# Patient Record
Sex: FEMALE | Race: WHITE | Employment: OTHER | URBAN - NONMETROPOLITAN AREA
[De-identification: names, ages, dates, MRNs, and addresses within clinical notes are randomized per-mention and may not be internally consistent; named-entity substitution may affect disease eponyms.]

---

## 2017-01-17 DIAGNOSIS — R63.5 WEIGHT GAIN: ICD-10-CM

## 2017-01-17 PROBLEM — R10.9 ABDOMINAL SPASMS: Status: ACTIVE | Noted: 2017-01-17

## 2017-01-17 LAB
T3 FREE: 3.7 PG/ML (ref 2–4.4)
T4 FREE: 1.1 NG/ML (ref 0.9–1.7)
TSH SERPL DL<=0.05 MIU/L-ACNC: 2.41 UIU/ML (ref 0.27–4.2)

## 2017-02-17 PROBLEM — R10.13 EPIGASTRIC BURNING SENSATION: Status: ACTIVE | Noted: 2017-02-17

## 2017-04-13 PROBLEM — B37.31 VAGINAL YEAST INFECTION: Status: ACTIVE | Noted: 2017-04-13

## 2017-07-11 DIAGNOSIS — K52.9 POSTPRANDIAL DIARRHEA: ICD-10-CM

## 2017-07-11 DIAGNOSIS — R19.5 ABNORMAL FINDINGS IN STOOL: ICD-10-CM

## 2017-07-11 LAB
C DIFFICILE TOXIN, EIA: NORMAL
CRYPTOSPORIDIUM ANTIGEN STOOL: NORMAL
GIARDIA ANTIGEN STOOL: NORMAL

## 2017-07-14 LAB
CULTURE, STOOL: NORMAL
E COLI SHIGA TOXIN ASSAY: NORMAL

## 2020-04-27 PROBLEM — E04.1 LEFT THYROID NODULE: Status: ACTIVE | Noted: 2020-04-27

## 2020-04-27 PROBLEM — J35.8 MUCOUS CYST OF TONSIL: Status: ACTIVE | Noted: 2020-04-27

## 2020-06-09 PROBLEM — Z98.890 S/P FINE NEEDLE ASPIRATION: Status: ACTIVE | Noted: 2020-06-09

## 2021-01-29 PROBLEM — R19.8 ALTERNATING CONSTIPATION AND DIARRHEA: Status: ACTIVE | Noted: 2021-01-29

## 2021-01-29 PROBLEM — Z86.010 HISTORY OF COLON POLYPS: Status: ACTIVE | Noted: 2021-01-29

## 2021-01-29 PROBLEM — R10.13 DYSPEPSIA: Status: ACTIVE | Noted: 2021-01-29

## 2021-01-29 PROBLEM — Z86.0100 HISTORY OF COLON POLYPS: Status: ACTIVE | Noted: 2021-01-29

## 2021-01-29 PROBLEM — R19.4 CHANGE IN BOWEL HABITS: Status: ACTIVE | Noted: 2021-01-29

## 2021-04-28 PROBLEM — K80.50 BILIARY COLIC: Status: ACTIVE | Noted: 2021-04-28

## 2021-10-12 PROBLEM — L98.9 SKIN LESION OF FACE: Chronic | Status: ACTIVE | Noted: 2021-09-29

## 2021-11-17 PROBLEM — L98.492: Status: ACTIVE | Noted: 2021-11-17

## 2021-11-17 PROBLEM — T81.30XA WOUND DEHISCENCE: Status: ACTIVE | Noted: 2021-11-17

## 2023-04-17 ENCOUNTER — OFFICE VISIT (OUTPATIENT)
Dept: NEUROLOGY | Age: 53
End: 2023-04-17

## 2023-04-17 VITALS
HEART RATE: 76 BPM | WEIGHT: 117 LBS | BODY MASS INDEX: 20.73 KG/M2 | SYSTOLIC BLOOD PRESSURE: 124 MMHG | OXYGEN SATURATION: 98 % | DIASTOLIC BLOOD PRESSURE: 76 MMHG | HEIGHT: 63 IN

## 2023-04-17 DIAGNOSIS — M54.6 CHRONIC BILATERAL THORACIC BACK PAIN: ICD-10-CM

## 2023-04-17 DIAGNOSIS — M54.2 NECK PAIN: ICD-10-CM

## 2023-04-17 DIAGNOSIS — G89.29 CHRONIC BILATERAL THORACIC BACK PAIN: ICD-10-CM

## 2023-04-17 DIAGNOSIS — R42 DIZZINESS: ICD-10-CM

## 2023-04-17 DIAGNOSIS — R25.1 TREMOR: Primary | ICD-10-CM

## 2023-04-17 PROCEDURE — 99214 OFFICE O/P EST MOD 30 MIN: CPT | Performed by: PSYCHIATRY & NEUROLOGY

## 2023-04-17 RX ORDER — PROPRANOLOL HYDROCHLORIDE 60 MG/1
60 TABLET ORAL 2 TIMES DAILY
Qty: 60 TABLET | Refills: 11 | Status: SHIPPED | OUTPATIENT
Start: 2023-04-17

## 2023-04-17 NOTE — PROGRESS NOTES
[facility] Neurology Office Visit    Patient:   Ava Schwartz  MR#:    [id number]  Account Number:                         YOB: 1970  Date of Evaluation:  4/17/2023  Time of Note:                          10:45 AM  Primary/Referring Physician:  Carley Bishop MD   Consulting Physician:  Otis Carrel, DO      Chief Complaint   Patient presents with    6 Month Follow-Up    Tremors       HISTORY OF PRESENT ILLNESS    Ava Schwartz is a 50y.o. year old female here with tremor, back pain, rib pain, here for follow up. Doing about the same since last seen overall, no overt worsening since last seen. Tremor is about the same since last seen, still waxing and waning. Back pain is about the same, no overt  worsening, waxing and waning. Started on beta-blocker by primary for hypertension. CT chest negative outside of a benign appearing thyroid nodule. Seen by ENT, aspiration performed and negative per patient. Labs negative outside of borderline h/h and low folate. CXR negative. CT -T-spine negative. Prior repeat CBC was ok. No new complaints today. PT helped with back pain previously. Noting neck pain and unchanged since last seen, also waxing and waning. Severity, quality, duration, is about the same since last seen. Noting more dizziness at times, improved overall since last seen. Not overtly vertigo. No other complaints today. Prior NCS/EMG BUE was normal. Some possible distal right lower extremity weakness noted intermittently as well, remains unchanged.       Past Medical History:   Diagnosis Date    Arthritis     Asthmatic bronchitis     Essential tremor     Fibrocystic breast disease 11/6/2012    Gastritis     GERD (gastroesophageal reflux disease)     Headache(784.0)     Kidney stones     Osteopenia     PONV (postoperative nausea and vomiting)     Tremor        Past Surgical History:   Procedure Laterality Date    BREAST LUMPECTOMY      BREAST SURGERY Bilateral 2000    saline breast implants

## 2023-08-28 ENCOUNTER — OFFICE VISIT (OUTPATIENT)
Dept: GASTROENTEROLOGY | Age: 53
End: 2023-08-28
Payer: COMMERCIAL

## 2023-08-28 VITALS
HEART RATE: 78 BPM | HEIGHT: 63 IN | BODY MASS INDEX: 18.78 KG/M2 | SYSTOLIC BLOOD PRESSURE: 138 MMHG | WEIGHT: 106 LBS | DIASTOLIC BLOOD PRESSURE: 80 MMHG | OXYGEN SATURATION: 98 %

## 2023-08-28 DIAGNOSIS — R10.13 EPIGASTRIC PAIN: Primary | ICD-10-CM

## 2023-08-28 DIAGNOSIS — R19.5 LOOSE STOOLS: ICD-10-CM

## 2023-08-28 DIAGNOSIS — R10.9 ABDOMINAL CRAMPING: ICD-10-CM

## 2023-08-28 DIAGNOSIS — R10.13 DYSPEPSIA: ICD-10-CM

## 2023-08-28 PROCEDURE — 3017F COLORECTAL CA SCREEN DOC REV: CPT | Performed by: NURSE PRACTITIONER

## 2023-08-28 PROCEDURE — 4004F PT TOBACCO SCREEN RCVD TLK: CPT | Performed by: NURSE PRACTITIONER

## 2023-08-28 PROCEDURE — G8427 DOCREV CUR MEDS BY ELIG CLIN: HCPCS | Performed by: NURSE PRACTITIONER

## 2023-08-28 PROCEDURE — 99214 OFFICE O/P EST MOD 30 MIN: CPT | Performed by: NURSE PRACTITIONER

## 2023-08-28 PROCEDURE — G8420 CALC BMI NORM PARAMETERS: HCPCS | Performed by: NURSE PRACTITIONER

## 2023-08-28 RX ORDER — SUCRALFATE ORAL 1 G/10ML
1 SUSPENSION ORAL 2 TIMES DAILY PRN
Qty: 600 ML | Refills: 5 | Status: SHIPPED | OUTPATIENT
Start: 2023-08-28

## 2023-08-28 RX ORDER — PANTOPRAZOLE SODIUM 40 MG/1
40 TABLET, DELAYED RELEASE ORAL
Qty: 90 TABLET | Refills: 3 | Status: SHIPPED | OUTPATIENT
Start: 2023-08-28

## 2023-08-28 RX ORDER — DICYCLOMINE HYDROCHLORIDE 10 MG/1
10 CAPSULE ORAL 3 TIMES DAILY PRN
Qty: 90 CAPSULE | Refills: 5 | Status: SHIPPED | OUTPATIENT
Start: 2023-08-28

## 2023-08-28 ASSESSMENT — ENCOUNTER SYMPTOMS
COUGH: 0
CHOKING: 0
TROUBLE SWALLOWING: 1
NAUSEA: 0
BLOOD IN STOOL: 0
ANAL BLEEDING: 0
DIARRHEA: 1
SHORTNESS OF BREATH: 0
ABDOMINAL DISTENTION: 0
CONSTIPATION: 0
RECTAL PAIN: 0
VOMITING: 0
ABDOMINAL PAIN: 1

## 2023-08-28 NOTE — PROGRESS NOTES
Subjective:     Patient ID: Yocasta Fairchild is a 48 y.o. female  PCP: Liyah Lantigua MD  Referring Provider: No ref. provider found    HPI  Patient presents to the office today with the following complaints: Follow-up      Pt seen today for follow up. Previously followed by FRAN [place], last OV 2021. She has c/o loose stools up to 12 times a day. \"I don't feel like I am ever completely empty. \"  She c/o abdominal burning and cramping. Pain will move throughout abdomen. She will use Dicyclomine 10 mg prn. This helps with burning. She reports issues swallowing Carafate and Welchol. She c/o throat is on fire, she has been out of PPI. Last EGD 2/2021 - gastritis, fundic gland polyps, NEG celiac, NEG h pylori  Last Colonoscopy 2/2021 - polyp, tics, internal hemorrhoids, TI bx benign, random colon bx negative; 3 yr recall  Family hx colon polyps and colon cancer - Father    Assessment:     1. Epigastric pain    2. Dyspepsia    3. Abdominal cramping    4. Loose stools            Plan:   - Restart Protonix   - Continue Carafate 1 gm QID prn   - Begin Dicyclomine 10 mg BID, may use QID prn   - Follow up in 4-6 weeks to check symptoms   - Consider Amitriptyline       Orders  No orders of the defined types were placed in this encounter.     Medications  Orders Placed This Encounter   Medications    sucralfate (CARAFATE) 1 GM/10ML suspension     Sig: Take 10 mLs by mouth 2 times daily as needed (heartburn)     Dispense:  600 mL     Refill:  5    pantoprazole (PROTONIX) 40 MG tablet     Sig: Take 1 tablet by mouth every morning (before breakfast)     Dispense:  90 tablet     Refill:  3    dicyclomine (BENTYL) 10 MG capsule     Sig: Take 1 capsule by mouth 3 times daily as needed (loose stools, lower abdominal cramping)     Dispense:  90 capsule     Refill:  5         Patient History:     Past Medical History:   Diagnosis Date    Arthritis     Asthmatic bronchitis     Essential tremor     Fibrocystic breast disease

## 2023-09-06 DIAGNOSIS — R19.5 ABNORMAL STOOLS: Primary | ICD-10-CM

## 2023-09-25 ENCOUNTER — OFFICE VISIT (OUTPATIENT)
Dept: GASTROENTEROLOGY | Age: 53
End: 2023-09-25
Payer: COMMERCIAL

## 2023-09-25 VITALS
DIASTOLIC BLOOD PRESSURE: 80 MMHG | SYSTOLIC BLOOD PRESSURE: 130 MMHG | OXYGEN SATURATION: 98 % | HEIGHT: 63 IN | BODY MASS INDEX: 18.96 KG/M2 | WEIGHT: 107 LBS | HEART RATE: 69 BPM

## 2023-09-25 DIAGNOSIS — R10.13 EPIGASTRIC PAIN: ICD-10-CM

## 2023-09-25 DIAGNOSIS — R10.9 ABDOMINAL CRAMPING: ICD-10-CM

## 2023-09-25 DIAGNOSIS — R10.13 DYSPEPSIA: Primary | ICD-10-CM

## 2023-09-25 DIAGNOSIS — K52.9 POSTPRANDIAL DIARRHEA: ICD-10-CM

## 2023-09-25 PROCEDURE — G8420 CALC BMI NORM PARAMETERS: HCPCS | Performed by: NURSE PRACTITIONER

## 2023-09-25 PROCEDURE — G8427 DOCREV CUR MEDS BY ELIG CLIN: HCPCS | Performed by: NURSE PRACTITIONER

## 2023-09-25 PROCEDURE — 99213 OFFICE O/P EST LOW 20 MIN: CPT | Performed by: NURSE PRACTITIONER

## 2023-09-25 PROCEDURE — 3017F COLORECTAL CA SCREEN DOC REV: CPT | Performed by: NURSE PRACTITIONER

## 2023-09-25 PROCEDURE — 4004F PT TOBACCO SCREEN RCVD TLK: CPT | Performed by: NURSE PRACTITIONER

## 2023-09-25 ASSESSMENT — ENCOUNTER SYMPTOMS
TROUBLE SWALLOWING: 0
DIARRHEA: 1
SHORTNESS OF BREATH: 0
CONSTIPATION: 0
COUGH: 0
NAUSEA: 0
ABDOMINAL DISTENTION: 0
ABDOMINAL PAIN: 1
CHOKING: 0
BLOOD IN STOOL: 0
RECTAL PAIN: 0
ANAL BLEEDING: 0
VOMITING: 0

## 2023-09-25 NOTE — PROGRESS NOTES
Subjective:     Patient ID: Shabana Rm is a 48 y.o. female  PCP: James Dave MD  Referring Provider: No ref. provider found    HPI  Patient presents to the office today with the following complaints: Follow-up      Pt seen today for follow up. Hx chronic GERD. Currently treated with Protonix 40 mg daily. She will use Carafate daily prn. Abdominal cramping improves with Dicyclomine. She will use this prn. She continues to have post prandial diarrhea, urgency at times. Last EGD 2/2021 - gastritis, fundic gland polyps, NEG celiac, NEG h pylori  Last Colonoscopy 2/2021 - polyp, tics, internal hemorrhoids, TI bx benign, random colon bx negative; 3 yr recall  Family hx colon polyps and colon cancer - Father    LAST HPI 8/28/2023  Pt seen today for follow up. Previously followed by FRAN [place], last OV 2021. She has c/o loose stools up to 12 times a day. \"I don't feel like I am ever completely empty. \"  She c/o abdominal burning and cramping. Pain will move throughout abdomen. She will use Dicyclomine 10 mg prn. This helps with burning. She reports issues swallowing Carafate and Welchol. She c/o throat is on fire, she has been out of PPI. Plan:   - Restart Protonix   - Continue Carafate 1 gm QID prn   - Begin Dicyclomine 10 mg BID, may use QID prn   - Follow up in 4-6 weeks to check symptoms   - Consider Amitriptyline       Assessment:     1. Dyspepsia    2. Epigastric pain    3. Abdominal cramping    4. Postprandial diarrhea            Plan:   - Continue Dicyclomine 10 mg BID, QID prn   - Continue Protonix 40 mg daily  - Daily fiber supplement recommended to help regulate stools   - Follow up in 6 months or sooner if needed  - Call with any questions or concerns       Orders  No orders of the defined types were placed in this encounter. Medications  No orders of the defined types were placed in this encounter.         Patient History:     Past Medical History:   Diagnosis Date

## 2023-10-17 ENCOUNTER — OFFICE VISIT (OUTPATIENT)
Dept: NEUROLOGY | Age: 53
End: 2023-10-17
Payer: COMMERCIAL

## 2023-10-17 VITALS
BODY MASS INDEX: 18.96 KG/M2 | DIASTOLIC BLOOD PRESSURE: 68 MMHG | WEIGHT: 107 LBS | SYSTOLIC BLOOD PRESSURE: 118 MMHG | HEIGHT: 63 IN | OXYGEN SATURATION: 99 % | HEART RATE: 70 BPM

## 2023-10-17 DIAGNOSIS — G89.29 CHRONIC BILATERAL THORACIC BACK PAIN: ICD-10-CM

## 2023-10-17 DIAGNOSIS — M54.6 CHRONIC BILATERAL THORACIC BACK PAIN: ICD-10-CM

## 2023-10-17 DIAGNOSIS — R25.1 TREMOR: Primary | ICD-10-CM

## 2023-10-17 DIAGNOSIS — M54.2 NECK PAIN: ICD-10-CM

## 2023-10-17 DIAGNOSIS — R42 DIZZINESS: ICD-10-CM

## 2023-10-17 PROCEDURE — 4004F PT TOBACCO SCREEN RCVD TLK: CPT | Performed by: PSYCHIATRY & NEUROLOGY

## 2023-10-17 PROCEDURE — 3017F COLORECTAL CA SCREEN DOC REV: CPT | Performed by: PSYCHIATRY & NEUROLOGY

## 2023-10-17 PROCEDURE — G8420 CALC BMI NORM PARAMETERS: HCPCS | Performed by: PSYCHIATRY & NEUROLOGY

## 2023-10-17 PROCEDURE — 99214 OFFICE O/P EST MOD 30 MIN: CPT | Performed by: PSYCHIATRY & NEUROLOGY

## 2023-10-17 PROCEDURE — G8427 DOCREV CUR MEDS BY ELIG CLIN: HCPCS | Performed by: PSYCHIATRY & NEUROLOGY

## 2023-10-17 PROCEDURE — G8484 FLU IMMUNIZE NO ADMIN: HCPCS | Performed by: PSYCHIATRY & NEUROLOGY

## 2023-10-17 NOTE — PROGRESS NOTES
regularly if worsens. 2.  Visual Disturbance:  Suspect retinal migraine, opthalmology agrees, MRI negative previously, follow, still not frequent enough to warrant prophylaxsis. 3.  Thoracic Back Pain/Rib pain/Thyrioid nodule:  Still about the same, PT has helped previously, CT T-spine negative, unable to get MRI, CT chest negative outside thyroid nodules, ENT has evaluated eval given neck mass and thyroid nodule on CT, no further workup recommended. Repeat US here with slight growth of the nodule, ENT following s/p aspiration biopsy and negative per patient. Some intermittent right lower extremity weakness noted, consider L-spine imaging, repeat NCS/EMG if worsens, will hold off for now. 4.  Elevated H/H:  Continue follow up with primary, question if tobacco use related, prior repeat CBC stable-borderline. 5.  Neck pain/UE weakness:  NCS/EMG BUE normal, MRI C-spine not completed due to insurance issues, will re-try if worsens, patient would like to hold off for now, will call if worsens, will follow. 6.  Dizziness:  Positional, but not overtly c/w vertigo. Doing better since last seen, consider repeating Brain MRI and MRA head and neck if worsens, will call if worsens.        Sindhu Hopper, DO  Board Certified Neurologist

## 2023-10-25 ENCOUNTER — HOSPITAL ENCOUNTER (OUTPATIENT)
Dept: ULTRASOUND IMAGING | Age: 53
Discharge: HOME | End: 2023-10-25
Payer: COMMERCIAL

## 2023-10-25 DIAGNOSIS — E04.1 NONTOXIC SINGLE THYROID NODULE: ICD-10-CM

## 2023-10-25 PROCEDURE — 76536 US EXAM OF HEAD AND NECK: CPT

## 2023-11-17 ENCOUNTER — HOSPITAL ENCOUNTER (OUTPATIENT)
Dept: WOMENS IMAGING | Age: 53
Discharge: HOME | End: 2023-11-17
Payer: COMMERCIAL

## 2023-11-17 DIAGNOSIS — Z12.31 ENCOUNTER FOR SCREENING MAMMOGRAM FOR MALIGNANT NEOPLASM OF BREAST: ICD-10-CM

## 2023-11-17 PROCEDURE — 77063 BREAST TOMOSYNTHESIS BI: CPT

## 2024-02-20 ENCOUNTER — OFFICE VISIT (OUTPATIENT)
Dept: NEUROLOGY | Age: 54
End: 2024-02-20
Payer: COMMERCIAL

## 2024-02-20 VITALS
BODY MASS INDEX: 18.96 KG/M2 | WEIGHT: 107 LBS | SYSTOLIC BLOOD PRESSURE: 108 MMHG | HEIGHT: 63 IN | HEART RATE: 78 BPM | OXYGEN SATURATION: 99 % | DIASTOLIC BLOOD PRESSURE: 66 MMHG

## 2024-02-20 DIAGNOSIS — R42 DIZZINESS: ICD-10-CM

## 2024-02-20 DIAGNOSIS — M54.2 NECK PAIN: ICD-10-CM

## 2024-02-20 DIAGNOSIS — G89.29 CHRONIC BILATERAL THORACIC BACK PAIN: ICD-10-CM

## 2024-02-20 DIAGNOSIS — R25.1 TREMOR: Primary | ICD-10-CM

## 2024-02-20 DIAGNOSIS — M54.6 CHRONIC BILATERAL THORACIC BACK PAIN: ICD-10-CM

## 2024-02-20 PROCEDURE — G8420 CALC BMI NORM PARAMETERS: HCPCS | Performed by: PSYCHIATRY & NEUROLOGY

## 2024-02-20 PROCEDURE — 4004F PT TOBACCO SCREEN RCVD TLK: CPT | Performed by: PSYCHIATRY & NEUROLOGY

## 2024-02-20 PROCEDURE — 3017F COLORECTAL CA SCREEN DOC REV: CPT | Performed by: PSYCHIATRY & NEUROLOGY

## 2024-02-20 PROCEDURE — G8484 FLU IMMUNIZE NO ADMIN: HCPCS | Performed by: PSYCHIATRY & NEUROLOGY

## 2024-02-20 PROCEDURE — 99214 OFFICE O/P EST MOD 30 MIN: CPT | Performed by: PSYCHIATRY & NEUROLOGY

## 2024-02-20 PROCEDURE — G8427 DOCREV CUR MEDS BY ELIG CLIN: HCPCS | Performed by: PSYCHIATRY & NEUROLOGY

## 2024-02-20 RX ORDER — PRIMIDONE 50 MG/1
50 TABLET ORAL 2 TIMES DAILY
Qty: 60 TABLET | Refills: 5 | Status: SHIPPED | OUTPATIENT
Start: 2024-02-20

## 2024-02-20 NOTE — PROGRESS NOTES
normal    Memory as below see mental status examination in the neurologic exam        NEUROLOGICAL EXAM    Mental status   [x]Awake, alert, oriented   [x]Affect attention and concentration appear appropriate  [x]Recent and remote memory appears unremarkable  [x]Speech normal without dysarthria or aphasia, comprehension and repetition intact.   COMMENTS:    Cranial Nerves [x]No VF deficit to confrontation,  no papilledema on fundoscopic exam.  [x]PERRLA, EOMI, no nystagmus, conjugate eye movements, no ptosis  [x]Face symmetric  [x]Facial sensation intact  [x]Tongue midline no atrophy or fasciculations present  [x]Palate midline, hearing to finger rub normal bilaterally  [x]Shoulder shrug and SCM testing normal bilaterally  COMMENTS:   Motor   [x]5/5 strength x 4 extremities  [x]Normal bulk and tone  []No tremor present  [x]No rigidity or bradykinesia noted  COMMENTS: Postural Tremor   Sensory  [x]Sensation intact to light touch, pin prick, vibration, and proprioception BLE  []Sensation intact to light touch, pin prick, vibration, and proprioception BUE  COMMENTS:   Coordination [x]FTN normal bilaterally   []HTS normal bilaterally  []SHEBA normal bilaterally.   COMMENTS:   Reflexes  [x]Symmetric and non-pathological  [x]Toes down going bilaterally  [x]No clonus present  COMMENTS:   Gait                  [x]Normal steady gait    []Ataxic    []Spastic     []Magnetic     []Shuffling  COMMENTS:       LABS AND IMAGING  Records reviewed.       ASSESSMENT AND PLAN     53 y.o. here with tremor, back pain, visual disturbance, rib pain, thyroid nodule, neck pain, UE weakness, intermittent right lower extremity weakness distally. Doing about the same overall since last seen, waxing and waning, no overt progression noted.      1.  Tremor:  Exam most consistent with ET, back on beta blocker prn, will re-try low dose primidone 25 mg bid and titrate to 50 mg bid. Continue Inderal 60 mg daily, will take an extra 30 mg prn, taking

## 2024-03-25 ENCOUNTER — OFFICE VISIT (OUTPATIENT)
Dept: GASTROENTEROLOGY | Age: 54
End: 2024-03-25

## 2024-03-25 VITALS
SYSTOLIC BLOOD PRESSURE: 130 MMHG | WEIGHT: 110 LBS | HEIGHT: 63 IN | DIASTOLIC BLOOD PRESSURE: 70 MMHG | BODY MASS INDEX: 19.49 KG/M2 | OXYGEN SATURATION: 97 % | HEART RATE: 60 BPM

## 2024-03-25 DIAGNOSIS — Z80.0 FAMILY HX OF COLON CANCER: ICD-10-CM

## 2024-03-25 DIAGNOSIS — Z86.010 HX OF COLONIC POLYPS: Primary | ICD-10-CM

## 2024-03-25 DIAGNOSIS — R13.10 DYSPHAGIA, UNSPECIFIED TYPE: ICD-10-CM

## 2024-03-25 DIAGNOSIS — R10.13 DYSPEPSIA: ICD-10-CM

## 2024-03-25 DIAGNOSIS — K21.9 CHRONIC GERD: ICD-10-CM

## 2024-03-25 RX ORDER — SUCRALFATE ORAL 1 G/10ML
1 SUSPENSION ORAL 2 TIMES DAILY PRN
Qty: 600 ML | Refills: 5 | Status: SHIPPED | OUTPATIENT
Start: 2024-03-25

## 2024-03-25 NOTE — PROGRESS NOTES
Allergen Reactions    Codeine      childhood    Pcn [Penicillins] Itching       Review of Systems   Constitutional:  Negative for activity change, appetite change, fatigue, fever and unexpected weight change.   HENT:  Positive for trouble swallowing.    Respiratory:  Negative for cough, choking and shortness of breath.    Cardiovascular:  Negative for chest pain.   Gastrointestinal:  Positive for abdominal pain. Negative for abdominal distention, anal bleeding, blood in stool, constipation, diarrhea, nausea, rectal pain and vomiting.        Reflux   Allergic/Immunologic: Negative for food allergies.   All other systems reviewed and are negative.        Objective:     /70 (Site: Left Upper Arm)   Pulse 60   Ht 1.6 m (5' 3\")   Wt 49.9 kg (110 lb)   SpO2 97%   BMI 19.49 kg/m²     Physical Exam  Constitutional:       Appearance: Normal appearance.   HENT:      Head: Normocephalic.      Right Ear: External ear normal.      Left Ear: External ear normal.      Nose: Nose normal.   Eyes:      General:         Right eye: No discharge.         Left eye: No discharge.      Extraocular Movements: Extraocular movements intact.      Conjunctiva/sclera: Conjunctivae normal.   Cardiovascular:      Rate and Rhythm: Normal rate.   Pulmonary:      Effort: Pulmonary effort is normal. No respiratory distress.   Abdominal:      General: There is no distension.   Musculoskeletal:         General: Normal range of motion.      Cervical back: Normal range of motion.   Skin:     General: Skin is warm and dry.   Neurological:      General: No focal deficit present.      Mental Status: She is alert and oriented to person, place, and time.   Psychiatric:         Mood and Affect: Mood normal.         Behavior: Behavior normal.

## 2024-04-01 ENCOUNTER — TELEPHONE (OUTPATIENT)
Dept: GASTROENTEROLOGY | Age: 54
End: 2024-04-01

## 2024-04-01 NOTE — TELEPHONE ENCOUNTER
Called patient to remind them of their procedure with Dr. Ching Waters   at [facility]  on 4/3/24 to arrive at 830 AM     CONFIRMED  PATIENT HAS PREP INSTR & PRESCRIPTION

## 2024-04-03 ENCOUNTER — ANESTHESIA (OUTPATIENT)
Dept: OPERATING ROOM | Age: 54
End: 2024-04-03

## 2024-04-03 ENCOUNTER — HOSPITAL ENCOUNTER (OUTPATIENT)
Age: 54
Setting detail: OUTPATIENT SURGERY
Discharge: HOME | End: 2024-04-03
Attending: INTERNAL MEDICINE | Admitting: INTERNAL MEDICINE
Payer: COMMERCIAL

## 2024-04-03 ENCOUNTER — ANESTHESIA EVENT (OUTPATIENT)
Dept: OPERATING ROOM | Age: 54
End: 2024-04-03

## 2024-04-03 ENCOUNTER — APPOINTMENT (OUTPATIENT)
Dept: OPERATING ROOM | Age: 54
End: 2024-04-03
Attending: INTERNAL MEDICINE

## 2024-04-03 ENCOUNTER — HOSPITAL ENCOUNTER (OUTPATIENT)
Age: 54
Setting detail: SPECIMEN
Discharge: HOME | End: 2024-04-03
Payer: COMMERCIAL

## 2024-04-03 VITALS
HEART RATE: 63 BPM | HEIGHT: 63 IN | RESPIRATION RATE: 20 BRPM | DIASTOLIC BLOOD PRESSURE: 72 MMHG | TEMPERATURE: 97.6 F | WEIGHT: 110 LBS | SYSTOLIC BLOOD PRESSURE: 133 MMHG | BODY MASS INDEX: 19.49 KG/M2 | OXYGEN SATURATION: 100 %

## 2024-04-03 DIAGNOSIS — K21.9 CHRONIC GERD: ICD-10-CM

## 2024-04-03 DIAGNOSIS — R13.10 DYSPHAGIA, UNSPECIFIED TYPE: ICD-10-CM

## 2024-04-03 PROCEDURE — 43239 EGD BIOPSY SINGLE/MULTIPLE: CPT | Performed by: INTERNAL MEDICINE

## 2024-04-03 PROCEDURE — G8918 PT W/O PREOP ORDER IV AB PRO: HCPCS

## 2024-04-03 PROCEDURE — 45378 DIAGNOSTIC COLONOSCOPY: CPT

## 2024-04-03 PROCEDURE — G8907 PT DOC NO EVENTS ON DISCHARG: HCPCS

## 2024-04-03 PROCEDURE — 88305 TISSUE EXAM BY PATHOLOGIST: CPT

## 2024-04-03 PROCEDURE — 43239 EGD BIOPSY SINGLE/MULTIPLE: CPT

## 2024-04-03 PROCEDURE — 45378 DIAGNOSTIC COLONOSCOPY: CPT | Performed by: INTERNAL MEDICINE

## 2024-04-03 PROCEDURE — 43450 DILATE ESOPHAGUS 1/MULT PASS: CPT

## 2024-04-03 PROCEDURE — 43450 DILATE ESOPHAGUS 1/MULT PASS: CPT | Performed by: INTERNAL MEDICINE

## 2024-04-03 RX ORDER — SODIUM CHLORIDE, SODIUM LACTATE, POTASSIUM CHLORIDE, CALCIUM CHLORIDE 600; 310; 30; 20 MG/100ML; MG/100ML; MG/100ML; MG/100ML
INJECTION, SOLUTION INTRAVENOUS CONTINUOUS
Status: DISCONTINUED | OUTPATIENT
Start: 2024-04-03 | End: 2024-04-03 | Stop reason: HOSPADM

## 2024-04-03 RX ORDER — SODIUM CHLORIDE 9 MG/ML
INJECTION, SOLUTION INTRAVENOUS PRN
Status: CANCELLED | OUTPATIENT
Start: 2024-04-03

## 2024-04-03 RX ORDER — NALOXONE HYDROCHLORIDE 0.4 MG/ML
INJECTION, SOLUTION INTRAMUSCULAR; INTRAVENOUS; SUBCUTANEOUS PRN
Status: CANCELLED | OUTPATIENT
Start: 2024-04-03

## 2024-04-03 RX ORDER — MEPERIDINE HYDROCHLORIDE 25 MG/ML
12.5 INJECTION INTRAMUSCULAR; INTRAVENOUS; SUBCUTANEOUS EVERY 5 MIN PRN
Status: CANCELLED | OUTPATIENT
Start: 2024-04-03

## 2024-04-03 RX ORDER — LIDOCAINE HYDROCHLORIDE 20 MG/ML
INJECTION, SOLUTION EPIDURAL; INFILTRATION; INTRACAUDAL; PERINEURAL PRN
Status: DISCONTINUED | OUTPATIENT
Start: 2024-04-03 | End: 2024-04-03 | Stop reason: SDUPTHER

## 2024-04-03 RX ORDER — SODIUM CHLORIDE 0.9 % (FLUSH) 0.9 %
5-40 SYRINGE (ML) INJECTION PRN
Status: CANCELLED | OUTPATIENT
Start: 2024-04-03

## 2024-04-03 RX ORDER — SODIUM CHLORIDE 0.9 % (FLUSH) 0.9 %
5-40 SYRINGE (ML) INJECTION EVERY 12 HOURS SCHEDULED
Status: CANCELLED | OUTPATIENT
Start: 2024-04-03

## 2024-04-03 RX ORDER — DIPHENHYDRAMINE HYDROCHLORIDE 50 MG/ML
12.5 INJECTION INTRAMUSCULAR; INTRAVENOUS
Status: CANCELLED | OUTPATIENT
Start: 2024-04-03 | End: 2024-04-04

## 2024-04-03 RX ORDER — PROPOFOL 10 MG/ML
INJECTION, EMULSION INTRAVENOUS PRN
Status: DISCONTINUED | OUTPATIENT
Start: 2024-04-03 | End: 2024-04-03 | Stop reason: SDUPTHER

## 2024-04-03 RX ORDER — METOCLOPRAMIDE HYDROCHLORIDE 5 MG/ML
10 INJECTION INTRAMUSCULAR; INTRAVENOUS
Status: CANCELLED | OUTPATIENT
Start: 2024-04-03 | End: 2024-04-04

## 2024-04-03 RX ADMIN — PROPOFOL 50 MG: 10 INJECTION, EMULSION INTRAVENOUS at 10:23

## 2024-04-03 RX ADMIN — PROPOFOL 50 MG: 10 INJECTION, EMULSION INTRAVENOUS at 10:26

## 2024-04-03 RX ADMIN — PROPOFOL 30 MG: 10 INJECTION, EMULSION INTRAVENOUS at 10:32

## 2024-04-03 RX ADMIN — PROPOFOL 100 MG: 10 INJECTION, EMULSION INTRAVENOUS at 10:11

## 2024-04-03 RX ADMIN — LIDOCAINE HYDROCHLORIDE 100 MG: 20 INJECTION, SOLUTION EPIDURAL; INFILTRATION; INTRACAUDAL; PERINEURAL at 10:11

## 2024-04-03 RX ADMIN — SODIUM CHLORIDE, SODIUM LACTATE, POTASSIUM CHLORIDE, CALCIUM CHLORIDE: 600; 310; 30; 20 INJECTION, SOLUTION INTRAVENOUS at 09:00

## 2024-04-03 RX ADMIN — PROPOFOL 50 MG: 10 INJECTION, EMULSION INTRAVENOUS at 10:42

## 2024-04-03 RX ADMIN — PROPOFOL 50 MG: 10 INJECTION, EMULSION INTRAVENOUS at 10:16

## 2024-04-03 ASSESSMENT — PAIN - FUNCTIONAL ASSESSMENT
PAIN_FUNCTIONAL_ASSESSMENT: NONE - DENIES PAIN
PAIN_FUNCTIONAL_ASSESSMENT: NONE - DENIES PAIN

## 2024-04-03 NOTE — OP NOTE
Endoscopic Procedure Note    Patient: Mounika Lorenzo : 1970  Med Rec#: [id number] Acc#: [id number]     Primary Care Provider Elver Arita APRN - CNP    Endoscopist: Ching Waters MD, MD    Date of Procedure:  4/3/2024    Procedure:   EGD with     A Cai bougie dilation of esophagus and   cold biopsies    Indications:   For both EGD and colonoscopy exams today:  1. Hx of colonic polyps    2. Dyspepsia    3. Family hx of colon cancer    4. Chronic GERD    5. Dysphagia, unspecified type       6. Last EGD 2021 - gastritis, fundic gland polyps, NEG celiac, NEG h pylori     7. Last Colonoscopy 2021 - polyp, tics, internal hemorrhoids, TI bx benign, random colon bx negative; 3 yr recall     8. Family hx colon polyps and colon cancer - Father; Mother had Crohn's disease. Maternal uncle had Crohn's. Maternal aunt had Crohn's disease.     Anesthesia:  Sedation was administered by anesthesia who monitored the patient during the procedure.    Estimated Blood Loss: minimal    Procedure:   After reviewing the patient's chart and obtaining informed consent, the patient was placed in the left lateral decubitus position.  A forward-viewing Olympus endoscope was lubricated and inserted through the mouth into the oropharynx. Under direct visualization, the upper esophagus was intubated. The scope was advanced to the level of the third portion of duodenum. Scope was slowly withdrawn with careful inspection of the mucosal surfaces. The scope was retroflexed for inspection of the gastric fundus and incisura. Findings and maneuvers are listed in impression below.     Next, a lubricated Cai weighted Bougie dilator-54 Fr was gently introduced into the patient's mouth and passed into the Esophagus and into the proximal stomach without much resistance and then withdrawn. Repeat EGD was performed to verify dilation and scope tip was passed into the stomach. NO evidence of perforation or excessive bleeding was 
complications    DESCRIPTION OF PROCEDURE:     A time out was performed. After written informed consent was obtained, the patient was placed in the left lateral position.     The perianal area was inspected, and a digital rectal exam was performed. A rectal exam was performed: normal tone, no palpable lesions. At this point, a forward viewing Olympus colonoscope was inserted into the anus and carefully advanced to the cecum with some difficulty requiring external abdominal pressure during parts of this procedure and change in patient's body position from left lateral decubitus to supine and back to left lateral decubitus while negotiating the tip of the scope through the rectosigmoid area which appeared to be acute angled.  The cecum was identified by the ileocecal valve and the appendiceal orifice. The colonoscope was then slowly withdrawn with careful inspection of the mucosa in a linear and circumferential fashion. The scope was retroflexed in the rectum. Suction was utilized during the procedure to remove as much air as possible from the bowel. The colonoscope was removed from the patient, and the procedure was terminated.  Findings are listed below.        Findings:     NO large polyps or masses or strictures or colitis.  Suboptimal exam due to prep quality as described above.    Moderate to severe diverticulosis in the left colon  Internal hemorrhoids-Grade 1  Where it was clearly visible, the mucosa appeared normal throughout the entire examined colon  Retroflexion in the rectum was otherwise normal and revealed no further abnormalities      Recommendations:  1. Repeat colonoscopy: pending pathology -based on colonoscopy findings today, colon prep and her significant personal and family history-in 3 years for colon cancer surveillance; sooner if she were to develop lower GI symptoms such as bleeding, abdominal pain, change in bowel habits or stool caliber or if the patient has anemia or unexplained weight

## 2024-04-03 NOTE — ANESTHESIA POSTPROCEDURE EVALUATION
Department of Anesthesiology  Postprocedure Note    Patient: Mounika Lorenzo  MRN: [id number]  YOB: 1970  Date of evaluation: 4/3/2024    Procedure Summary       Date: 04/03/24 Room / Location: [place] [address] / [facility]    Anesthesia Start: 1005 Anesthesia Stop:     Procedures:       ESOPHAGOGASTRODUODENOSCOPY BIOPSY (Esophagus)      COLORECTAL CANCER SCREENING, NOT HIGH RISK (Abdomen) Diagnosis:       Chronic GERD      Dysphagia, unspecified type      Hx of colonic polyps      (Chronic GERD [K21.9])      (Dysphagia, unspecified type [R13.10])      (Hx of colonic polyps [Z86.010])    Surgeons: Ching Waters MD Responsible Provider: Mukund Worrell APRN - CRNA    Anesthesia Type: MAC ASA Status: 2            Anesthesia Type: No value filed.    Otto Phase I:      Otto Phase II:      Anesthesia Post Evaluation    Patient location during evaluation: bedside  Patient participation: complete - patient participated  Level of consciousness: awake  Pain score: 0  Nausea & Vomiting: no nausea and no vomiting  Cardiovascular status: blood pressure returned to baseline  Respiratory status: acceptable  Hydration status: stable  Pain management: adequate        No notable events documented.

## 2024-04-03 NOTE — DISCHARGE INSTRUCTIONS
1.  Await path results, the patient will be contacted in 7-10 days with biopsy results.   2.  Magic mouthwash 5 ml PO Swish and swallow q3h PRN ONLY IF patient has post-procedural sorethroat or chest pain.  3. Full liquids to soft diet today mogran discharge from the surgicenter; may advance diet starting in AM tomorrow.  4. May resume other meds except any ASA/NSAIDs; may use cough drops or lozenges PRN; also continue meds for GERD with anti-GERD measures.  5. NO ASA/NSAIDs x 2 weeks  6. OP f/u in 6-8 weeks with Ms. Espinal; will consider an Esophageal manometry later if the patient's dysphagia persists.     7. Repeat colonoscopy: pending pathology -based on colonoscopy findings today, colon prep and her significant personal and family history-in 3 years for colon cancer surveillance; sooner if she were to develop lower GI symptoms such as bleeding, abdominal pain, change in bowel habits or stool caliber or if the patient has anemia or unexplained weight loss in the future.     NSAIDS (Non-steroidal Anti-Inflammatory)    You have been directed by your physician to avoid any NSAID's; the following medications are a list of those to avoid. If you think that you are taking any NSAID's notify your physician.     Over The Counter    Advil                      Motrin  Nuprin                   Ibuprofen  Midol                     Aleve  Naproxen              Orudis  Aspirin                   [place]    Prescriptions and Generics    Cataflam              Relafen  Voltaren               Clinoril  Indocin                 Naproxen  Arthrotec              Lodine  Daypro                 Nalfon  Toradol                Ansaid  Feldene               Meclofenamate  Fenoprofen          Ponstel  Mobic                   Celebrex      POST-OP ORDERS: ENDOSCOPY & COLONOSCOPY:    1. Rest today.    2. DO NOT eat or drink until wide awake; eat your usual diet today in moderate amount only.    3. DO NOT drive today.    4. Call  physician if you have severe pain, vomiting, fever, rectal bleeding or black bowel movements.    5.  If a biopsy was taken or a polyp removed, you should expect to hear results in about 7-10 days.  If you have heard nothing from your physician by then, call the office for results.    6.  Discharge home when patient awake, vitals signs stable and tolerating liquids.    7. Call with questions or concerns [phone].

## 2024-04-03 NOTE — H&P
Patient Name: Mounika Lorenzo  : 1970  MRN: [id number]  DATE: 24    Allergies:   Allergies   Allergen Reactions    Codeine      childhood    Pcn [Penicillins] Itching        ENDOSCOPY  History and Physical    Procedure:    [x] Diagnostic Colonoscopy       [] Screening Colonoscopy  [x] EGD      [] ERCP      [] EUS       [] Other    [x] Previous office notes/History and Physical reviewed from the patients chart. Please see EMR for further details of HPI. I have examined the patient's status immediately prior to the procedure and:      Indications/HPI:    For both EGD and colonoscopy exams today:  1. Hx of colonic polyps    2. Dyspepsia    3. Family hx of colon cancer    4. Chronic GERD    5. Dysphagia, unspecified type       6. Last EGD 2021 - gastritis, fundic gland polyps, NEG celiac, NEG h pylori     7. Last Colonoscopy 2021 - polyp, tics, internal hemorrhoids, TI bx benign, random colon bx negative; 3 yr recall     8. Family hx colon polyps and colon cancer - Father; Mother had Crohn's disease. Maternal uncle had Crohn's. Maternal aunt had Crohn's disease.     []Abdominal Pain   []Cancer- GI/Lung     []Fhx of colon CA/polyps  []History of Polyps  []Barretts            []Melena  []Abnormal Imaging              []Dysphagia              []Persistent Pneumonia   []Anemia                            []Food Impaction        []History of Polyps  [] GI Bleed             []Pulmonary nodule/Mass   []Change in bowel habits []Heartburn/Reflux  []Rectal Bleed (BRBPR)  []Chest Pain - Non Cardiac []Heme (+) Stool []Ulcers  []Constipation  []Hemoptysis  []Varices  []Diarrhea  []Hypoxemia    []Nausea/Vomiting   []Screening   []Crohns/Colitis  []Other:     Anesthesia:   [x] MAC [] Moderate Sedation   [] General   [] None     ROS: 12 pt Review of Symptoms was negative unless mentioned above    Medications:   Prior to Admission medications    Medication Sig Start Date End Date Taking? Authorizing Provider

## 2024-04-05 DIAGNOSIS — Z80.0 FAMILY HISTORY OF ESOPHAGEAL CANCER: ICD-10-CM

## 2024-04-05 DIAGNOSIS — Z80.1 FAMILY HISTORY OF LUNG CANCER: ICD-10-CM

## 2024-04-05 DIAGNOSIS — Z80.3 FAMILY HISTORY OF BREAST CANCER: ICD-10-CM

## 2024-04-05 DIAGNOSIS — Z80.0 FAMILY HISTORY OF COLON CANCER: ICD-10-CM

## 2024-05-15 ENCOUNTER — OFFICE VISIT (OUTPATIENT)
Dept: GASTROENTEROLOGY | Age: 54
End: 2024-05-15
Payer: COMMERCIAL

## 2024-05-15 VITALS
SYSTOLIC BLOOD PRESSURE: 135 MMHG | HEART RATE: 82 BPM | HEIGHT: 63 IN | DIASTOLIC BLOOD PRESSURE: 80 MMHG | WEIGHT: 108 LBS | BODY MASS INDEX: 19.14 KG/M2 | OXYGEN SATURATION: 98 %

## 2024-05-15 DIAGNOSIS — K21.9 CHRONIC GERD: Primary | ICD-10-CM

## 2024-05-15 DIAGNOSIS — K58.0 IRRITABLE BOWEL SYNDROME WITH DIARRHEA: ICD-10-CM

## 2024-05-15 PROCEDURE — 99214 OFFICE O/P EST MOD 30 MIN: CPT | Performed by: NURSE PRACTITIONER

## 2024-05-15 PROCEDURE — 3017F COLORECTAL CA SCREEN DOC REV: CPT | Performed by: NURSE PRACTITIONER

## 2024-05-15 PROCEDURE — G8427 DOCREV CUR MEDS BY ELIG CLIN: HCPCS | Performed by: NURSE PRACTITIONER

## 2024-05-15 PROCEDURE — G8420 CALC BMI NORM PARAMETERS: HCPCS | Performed by: NURSE PRACTITIONER

## 2024-05-15 PROCEDURE — 4004F PT TOBACCO SCREEN RCVD TLK: CPT | Performed by: NURSE PRACTITIONER

## 2024-05-15 ASSESSMENT — ENCOUNTER SYMPTOMS
DIARRHEA: 1
TROUBLE SWALLOWING: 0
CHOKING: 0
COUGH: 0
ABDOMINAL DISTENTION: 0
NAUSEA: 0
RECTAL PAIN: 0
ABDOMINAL PAIN: 1
SHORTNESS OF BREATH: 0
ANAL BLEEDING: 0
BLOOD IN STOOL: 0
VOMITING: 0

## 2024-05-15 NOTE — PROGRESS NOTES
Subjective:     Patient ID: Mounika Lorenzo is a 54 y.o. female  PCP: Elver Arita APRN - CNP  Referring Provider: No ref. provider found    HPI  Patient presents to the office today with the following complaints: Follow-up      Pt seen today for follow up after EGD and Colonoscopy on 4/3/2024.  Pt had c/o reflux, dysphagia at last OV.  Currently treated with Protonix 40 mg daily.  Gastric polyps noted on EGD.  Empirical dilation of esophagus performed as well.  Symptoms are managed with medications and diet.  Hx IBS with diarrhea, cramping.  She will use Dicyclomine prn.  This is working well for her.              Last EGD 4/3/2024 - W 54 fr dil, (+)GERD, multiple 3-5 mm sessile polyps-likely hyperplastic fundic gland type in body/fundus of stomach   Last Colonoscopy 4/3/2024 - Mod to severe diverticulosis left colon, int hem Gr 1, 3 year recall   Family hx colon cancer - Father  Family hx colon polyps - Sister, Brother, Father  Family hx Crohn's - Mother    Assessment:     1. Chronic GERD    2. Irritable bowel syndrome with diarrhea              Plan:   - Continue Protonix 40 mg daily  - Continue Dicyclomine 10 mg prn  - Follow up yearly or sooner if needed  - Call with any questions or concerns       Orders  No orders of the defined types were placed in this encounter.    Medications  No orders of the defined types were placed in this encounter.        Patient History:     Past Medical History:   Diagnosis Date    Arthritis     Asthmatic bronchitis     Essential tremor     Fibrocystic breast disease 2012    Gastritis     GERD (gastroesophageal reflux disease)     Headache(784.0)     Kidney stones     Osteopenia     PONV (postoperative nausea and vomiting)     Tremor        Past Surgical History:   Procedure Laterality Date    BREAST LUMPECTOMY      BREAST SURGERY Bilateral     saline breast implants    BREAST SURGERY  2009    left - benign fibroadenoma - Dr Chambers     SECTION

## 2024-05-15 NOTE — PATIENT INSTRUCTIONS
instead of two or three large meals.  After you eat, wait 2 to 3 hours before you lie down. Snacking close to bedtime can make your symptoms worse.  Avoid foods that make your symptoms worse. These may include chocolate, mint, alcohol, pepper, spicy foods, high-fat foods, or drinks with caffeine in them, such as tea, coffee, michelle, or energy drinks. If your symptoms are worse after you eat a certain food, you may want to stop eating it to see if your symptoms get better.  Try to quit smoking or chewing tobacco, or cut back as much as you can. If you need help quitting, talk to your doctor about quit-tobacco programs and medicines. These can increase your chances of quitting for good.  If you have GERD symptoms while trying to sleep, raise the head of your bed 6 to 8 inches by putting the frame on blocks or placing a foam wedge under the head of your mattress. (Adding extra pillows does not work.)  Do not wear tight clothing around your middle.  When should you call for help?   Call 911  anytime you think you may need emergency care. For example, call if:    You passed out (lost consciousness).   Call your doctor now or seek immediate medical care if:    You have new or worse belly pain.     Your stools are black and tarlike or have streaks of blood.     You vomit blood.   Watch closely for changes in your health, and be sure to contact your doctor if:    Your symptoms have not improved after 2 weeks.     Food seems to catch in your throat or chest.   Where can you learn more?  Go to https://www.[employer].net/patientEd and enter T927 to learn more about \"Gastroesophageal Reflux Disease (GERD): Care Instructions.\"  Current as of: October 19, 2023               Content Version: 14.0  © 4165-5872 [employer].   Care instructions adapted under license by [employer] If you have questions about a medical condition or this instruction, always ask your healthcare professional. [employer]

## 2024-06-10 RX ORDER — PROPRANOLOL HYDROCHLORIDE 60 MG/1
60 TABLET ORAL 2 TIMES DAILY
Qty: 60 TABLET | Refills: 11 | Status: SHIPPED | OUTPATIENT
Start: 2024-06-10

## 2024-06-10 RX ORDER — PROPRANOLOL HYDROCHLORIDE 60 MG/1
60 TABLET ORAL 2 TIMES DAILY
Qty: 60 TABLET | Refills: 11 | OUTPATIENT
Start: 2024-06-10

## 2024-06-10 NOTE — TELEPHONE ENCOUNTER
Mounika Bj has requested a refill on her medication.      Last office visit : 2/20/2024   Next office visit : 6/8/2024       Requested Prescriptions     Pending Prescriptions Disp Refills    propranolol (INDERAL) 60 MG tablet [Pharmacy Med Name: Propranolol HCl 60 MG Oral Tablet] 60 tablet 0     Sig: Take 1 tablet by mouth twice daily

## 2024-07-23 ENCOUNTER — TELEPHONE (OUTPATIENT)
Dept: GASTROENTEROLOGY | Age: 54
End: 2024-07-23

## 2024-07-23 NOTE — TELEPHONE ENCOUNTER
Pt called today stating she is having horrible reflux even though she is taking her medications. Last ov 5/15/24. She is taking protonix and then dicyclomine as carafate as needed. She is using the carafate for now. She said she also has a rash on her ankle that she thinks it's not related though. She is missing work due to not getting her food to go down. An episode can last 24 hours. Nothing makes it worse or better food wise. Please advise.

## 2024-08-21 ENCOUNTER — OFFICE VISIT (OUTPATIENT)
Dept: GASTROENTEROLOGY | Age: 54
End: 2024-08-21
Payer: COMMERCIAL

## 2024-08-21 VITALS
DIASTOLIC BLOOD PRESSURE: 80 MMHG | WEIGHT: 116 LBS | SYSTOLIC BLOOD PRESSURE: 138 MMHG | HEIGHT: 63 IN | BODY MASS INDEX: 20.55 KG/M2 | OXYGEN SATURATION: 97 % | HEART RATE: 77 BPM

## 2024-08-21 DIAGNOSIS — K21.9 CHRONIC GERD: Primary | ICD-10-CM

## 2024-08-21 PROCEDURE — 99214 OFFICE O/P EST MOD 30 MIN: CPT | Performed by: NURSE PRACTITIONER

## 2024-08-21 PROCEDURE — G8420 CALC BMI NORM PARAMETERS: HCPCS | Performed by: NURSE PRACTITIONER

## 2024-08-21 PROCEDURE — 4004F PT TOBACCO SCREEN RCVD TLK: CPT | Performed by: NURSE PRACTITIONER

## 2024-08-21 PROCEDURE — G8427 DOCREV CUR MEDS BY ELIG CLIN: HCPCS | Performed by: NURSE PRACTITIONER

## 2024-08-21 PROCEDURE — 3017F COLORECTAL CA SCREEN DOC REV: CPT | Performed by: NURSE PRACTITIONER

## 2024-08-21 RX ORDER — VONOPRAZAN FUMARATE 26.72 MG/1
20 TABLET ORAL DAILY
Qty: 10 TABLET | Refills: 0 | Status: SHIPPED | COMMUNITY
Start: 2024-08-21

## 2024-08-21 ASSESSMENT — ENCOUNTER SYMPTOMS
VOMITING: 0
ABDOMINAL DISTENTION: 0
ABDOMINAL PAIN: 0
DIARRHEA: 0
COUGH: 0
CHOKING: 0
BLOOD IN STOOL: 0
SHORTNESS OF BREATH: 0
NAUSEA: 0
CONSTIPATION: 0
ANAL BLEEDING: 0
RECTAL PAIN: 0
TROUBLE SWALLOWING: 0

## 2024-08-21 NOTE — PROGRESS NOTES
Subjective:     Patient ID: Mounika Lorenzo is a 54 y.o. female  PCP: Elver Arita APRN - CNP  Referring Provider: No ref. provider found    HPI  Patient presents to the office today with the following complaints: Gastroesophageal Reflux      Pt seen today for follow up.  Hx chronic GERD, uncontrolled.  She is taking Protonix 40 mg daily, Carafate.  Pepcid BID OTC added last month when pt called with uncontrolled symptoms.  Today, she continues to have breakthrough reflux everyday.  \"Fire in chest.  Worse if I don't eat.\"  She has tried and failed Omeprazole.  She admits to drinking ETOH, Coffee.  Pt is aware these are triggers for acid reflux.               Last EGD 4/3/2024 - W 54 fr dil, (+)GERD, multiple 3-5 mm sessile polyps-likely hyperplastic fundic gland type in body/fundus of stomach   Last Colonoscopy 4/3/2024 - Mod to severe diverticulosis left colon, int hem Gr 1, 3 year recall   Family hx colon cancer - Father  Family hx colon polyps - Sister, Brother, Father  Family hx Crohn's - Mother    Assessment:     1. Chronic GERD          Plan:   - Stop Protonix   - Trial of Voquenza 20 mg daily, samples provided  - Ok for Carafate prn  - Ok for Pepcid BID prn   - Follow up in 6 weeks or sooner if needed  - Call with any questions or concerns       Orders  No orders of the defined types were placed in this encounter.    Medications  Orders Placed This Encounter   Medications    Vonoprazan Fumarate (VOQUEZNA) 20 MG TABS     Sig: Take 20 mg by mouth daily     Dispense:  10 tablet     Refill:  0         Patient History:     Past Medical History:   Diagnosis Date    Arthritis     Asthmatic bronchitis     Essential tremor     Fibrocystic breast disease 11/6/2012    Gastritis     GERD (gastroesophageal reflux disease)     Headache(784.0)     Kidney stones     Osteopenia     PONV (postoperative nausea and vomiting)     Tremor        Past Surgical History:   Procedure Laterality Date    BREAST LUMPECTOMY      BREAST

## 2024-08-26 ENCOUNTER — OFFICE VISIT (OUTPATIENT)
Age: 54
End: 2024-08-26
Payer: COMMERCIAL

## 2024-08-26 VITALS — HEART RATE: 76 BPM | TEMPERATURE: 98 F | DIASTOLIC BLOOD PRESSURE: 62 MMHG | SYSTOLIC BLOOD PRESSURE: 125 MMHG

## 2024-08-26 DIAGNOSIS — C44.319 BASAL CELL CARCINOMA OF LEFT FOREHEAD: Primary | ICD-10-CM

## 2024-08-26 DIAGNOSIS — Z72.0 TOBACCO ABUSE DISORDER: ICD-10-CM

## 2024-08-26 PROCEDURE — 99203 OFFICE O/P NEW LOW 30 MIN: CPT | Performed by: NURSE PRACTITIONER

## 2024-08-26 RX ORDER — VONOPRAZAN FUMARATE 26.72 MG/1
20 TABLET ORAL DAILY
COMMUNITY
Start: 2024-08-21

## 2024-08-26 RX ORDER — PROPRANOLOL HYDROCHLORIDE 60 MG/1
1 TABLET ORAL 2 TIMES DAILY
COMMUNITY
Start: 2024-06-10

## 2024-08-28 ENCOUNTER — TELEPHONE (OUTPATIENT)
Dept: GASTROENTEROLOGY | Age: 54
End: 2024-08-28

## 2024-08-28 NOTE — TELEPHONE ENCOUNTER
----- Message -----  From: Carolyn Espinal APRN - LEONIDAS  Sent: 8/21/2024   9:14 AM CDT  To: Estefani Rossi MA    Check efficacy of Voquezna next week        8.28.24  Called patient to see how she is doing on the Voquezna. Patient said that she is doing a little better than she was doing.      Routed to Carolyn JOSHI

## 2024-09-03 RX ORDER — VONOPRAZAN FUMARATE 26.72 MG/1
20 TABLET ORAL DAILY
Qty: 30 TABLET | Refills: 2 | Status: SHIPPED | OUTPATIENT
Start: 2024-09-03

## 2024-09-09 ENCOUNTER — TELEPHONE (OUTPATIENT)
Dept: NEUROLOGY | Age: 54
End: 2024-09-09

## 2024-09-10 ENCOUNTER — OFFICE VISIT (OUTPATIENT)
Dept: NEUROLOGY | Age: 54
End: 2024-09-10
Payer: COMMERCIAL

## 2024-09-10 VITALS
BODY MASS INDEX: 20.55 KG/M2 | DIASTOLIC BLOOD PRESSURE: 78 MMHG | OXYGEN SATURATION: 99 % | HEART RATE: 88 BPM | HEIGHT: 63 IN | WEIGHT: 116 LBS | SYSTOLIC BLOOD PRESSURE: 126 MMHG

## 2024-09-10 DIAGNOSIS — R25.1 TREMOR: Primary | ICD-10-CM

## 2024-09-10 DIAGNOSIS — M54.6 CHRONIC BILATERAL THORACIC BACK PAIN: ICD-10-CM

## 2024-09-10 DIAGNOSIS — R25.1 TREMOR: ICD-10-CM

## 2024-09-10 DIAGNOSIS — R42 DIZZINESS: ICD-10-CM

## 2024-09-10 DIAGNOSIS — M54.2 NECK PAIN: ICD-10-CM

## 2024-09-10 DIAGNOSIS — G89.29 CHRONIC BILATERAL THORACIC BACK PAIN: ICD-10-CM

## 2024-09-10 LAB
ALBUMIN SERPL-MCNC: 4.3 G/DL (ref 3.5–5.2)
ALP SERPL-CCNC: 116 U/L (ref 35–104)
ALT SERPL-CCNC: 15 U/L (ref 5–33)
ANION GAP SERPL CALCULATED.3IONS-SCNC: 10 MMOL/L (ref 7–19)
AST SERPL-CCNC: 18 U/L (ref 5–32)
BILIRUB SERPL-MCNC: 0.3 MG/DL (ref 0.2–1.2)
BUN SERPL-MCNC: 7 MG/DL (ref 6–20)
CALCIUM SERPL-MCNC: 9.1 MG/DL (ref 8.6–10)
CHLORIDE SERPL-SCNC: 99 MMOL/L (ref 98–111)
CK SERPL-CCNC: 72 U/L (ref 26–192)
CO2 SERPL-SCNC: 27 MMOL/L (ref 22–29)
CREAT SERPL-MCNC: 0.6 MG/DL (ref 0.5–0.9)
CRP SERPL HS-MCNC: <0.3 MG/DL (ref 0–0.5)
GLUCOSE SERPL-MCNC: 87 MG/DL (ref 70–99)
POTASSIUM SERPL-SCNC: 4.4 MMOL/L (ref 3.5–5)
PROT SERPL-MCNC: 6.7 G/DL (ref 6.4–8.3)
SODIUM SERPL-SCNC: 136 MMOL/L (ref 136–145)
T4 FREE SERPL-MCNC: 1.16 NG/DL (ref 0.93–1.7)
TSH SERPL DL<=0.005 MIU/L-ACNC: 2.23 UIU/ML (ref 0.27–4.2)
VIT B12 SERPL-MCNC: 382 PG/ML (ref 232–1245)

## 2024-09-10 PROCEDURE — G8420 CALC BMI NORM PARAMETERS: HCPCS | Performed by: PSYCHIATRY & NEUROLOGY

## 2024-09-10 PROCEDURE — 99214 OFFICE O/P EST MOD 30 MIN: CPT | Performed by: PSYCHIATRY & NEUROLOGY

## 2024-09-10 PROCEDURE — 4004F PT TOBACCO SCREEN RCVD TLK: CPT | Performed by: PSYCHIATRY & NEUROLOGY

## 2024-09-10 PROCEDURE — G8427 DOCREV CUR MEDS BY ELIG CLIN: HCPCS | Performed by: PSYCHIATRY & NEUROLOGY

## 2024-09-10 PROCEDURE — 3017F COLORECTAL CA SCREEN DOC REV: CPT | Performed by: PSYCHIATRY & NEUROLOGY

## 2024-09-16 RX ORDER — PANTOPRAZOLE SODIUM 40 MG/1
TABLET, DELAYED RELEASE ORAL
Qty: 30 TABLET | Refills: 0 | OUTPATIENT
Start: 2024-09-16

## 2024-09-30 ENCOUNTER — PROCEDURE VISIT (OUTPATIENT)
Age: 54
End: 2024-09-30
Payer: COMMERCIAL

## 2024-09-30 VITALS
DIASTOLIC BLOOD PRESSURE: 76 MMHG | SYSTOLIC BLOOD PRESSURE: 138 MMHG | HEIGHT: 63 IN | WEIGHT: 120 LBS | TEMPERATURE: 98 F | RESPIRATION RATE: 20 BRPM | BODY MASS INDEX: 21.26 KG/M2 | HEART RATE: 82 BPM

## 2024-09-30 DIAGNOSIS — C44.319 BASAL CELL CARCINOMA OF LEFT FOREHEAD: Primary | ICD-10-CM

## 2024-09-30 PROCEDURE — 14041 TIS TRNFR F/C/C/M/N/A/G/H/F: CPT | Performed by: OTOLARYNGOLOGY

## 2024-09-30 NOTE — PATIENT INSTRUCTIONS
[facility], Post-Procedure Instructions:    Protect the incisions from sunlight. Sunlight to the incisions will cause permanent pigmentation to the incision line and make the incision more noticeable. After the incision has reepithelialized (typically 2-3 weeks after the procedure), you may begin to use sunscreen with an SPF of 15 or greater.  Avoid sun exposure to the incision for 12 months after the procedure to optimize aesthetic results.    For the first week after your procedure, apply a thin coat of Vaseline to the incision 3-4 times daily.  The goal is to keep a thin coat of Vaseline on the incision at all times to optimize healing.  Starting the second week, use a silicone-based wound cream to the incisions to optimize the end result. Apply topically twice daily, or as directed, to help optimize wound healing and decrease redness.  Should the area need to be cleaned, gently apply peroxide on a Q-tip to the area.  Do not clean the area more than once daily with peroxide, as it can delay wound healing.    Avoid getting water on the surgical site for 3 days.  On day 4, you may briefly get the surgical site with clean water, but avoid soapy water to the area for 1 week.      If you have any questions or concerns following your procedure, please give us a call.  We have the ability to schedule a video visit, phone visit, or follow-up visit to address any concerns.  Many of your questions and concerns may be able to be answered over the phone.  Our direct line is [phone].    It is very important to continue routine skin checks with a dermatologist or your PCP every 6-12 months.     CONTACT INFORMATION:  The main office phone number is [phone]. For emergencies after hours and on weekends, this number will convert over to our answering service and the on call provider will answer. Please try to keep non emergent phone calls/ questions to office hours 9am-5pm Monday through Friday.      [employer]  As an alternative, you can sign up and use the Epic MyChart system for more direct and quicker access for non emergent questions/ problems.  [facility] allows you to send messages to your doctor, view your test results, renew your prescriptions, schedule appointments, and more. To sign up, go to [email or web address].    If you have questions, you can email [email or web address] or call [phone] to talk to our [employer] staff. Remember, [employer] is NOT to be used for urgent needs. For medical emergencies, dial 911.    IF YOU SMOKE, VAPE OR USE TOBACCO PLEASE READ THE FOLLOWING:  Why is smoking bad for me?  Smoking increases the risk of heart disease, lung disease, vascular disease, stroke, and cancer. If you smoke, STOP!      IF YOU SMOKE, VAPE OR USE TOBACCO PLEASE READ THE FOLLOWING:  Why is smoking bad for me?  Smoking increases the risk of heart disease, lung disease, vascular disease, stroke, and cancer. If you smoke, STOP!    For more information:  Quit Now [place]  1-800-QUIT-NOW  https://[place].quitlogix.org/en-[place]/

## 2024-09-30 NOTE — LETTER
September 30, 2024     DALIA Green  [address] [place] [address]    Patient: Estefany Garrido   YOB: 1970   Date of Visit: 9/30/2024     Dear DALIA Green:       Thank you for referring Estefany Garrido to me for evaluation. Below are the relevant portions of my assessment and plan of care.    If you have questions, please do not hesitate to call me. I look forward to following Estefany along with you.         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 Garrido presented with a biopsy-proven nodular basal cell carcinoma of the forehead.  She opted for surgical excision and reconstruction.  We discussed sending this for permanent section pathology versus frozen section pathology.  We opted on permanent section pathology.    OPERATIVE FINDINGS:     Lesion: 6 mm x 4 mm  Margins: At least 5 mm  Defect: 14 mm x 20 mm  Flap and Defect: 2.0 cm x 5.5 cm  Depth: To the frontalis musculature    OPERATIVE DETAILS:     After patient verification consent material was reviewed, the patient was taken to the  procedure room and laid supine on the procedure table.  The skin at the area was cleansed with alcohol, the area marked, appropriate margins (as listed above) were drawn, and this was converted into an A-T flap design, the patient was then handed a mirror where we reviewed the intended excision, and verified the consent.  This served as the formal timeout procedure.  The skin was  infiltrated with 1% lidocaine with 1-100,000 epinephrine.    After approximately 15 minutes, the skin was tested for anesthesia and deemed appropriate.    The patient was sterilely prepped with Hibiclens, and sterilely draped.    The skin surrounding the lesion that was previously marked was incised utilizing a #15 blade.  This was undermined in the subcutaneous fat plane, overlying the frontalis musculature.  I placed a stitch at 12:00, and sent this for permanent pathology.  Hemostasis was obtained with bipolar cautery set at 15.    Utilizing a fresh 15 blade, the flap incisions were then created.  The flap was then undermined in the immediate subcutaneous plane utilizing a 15 blade and curved iris scissors.  Hemostasis was again obtained utilizing bipolar cautery set at 15.    The flaps were advanced, and closed utilizing deep 3-0 and 4-0 undyed Vicryl suture.  The skin was further closed utilizing running, locking 4-0 Prolene.      Antibiotic ointment was placed to the incisions and the flaps.      DISPOSITION:  The procedures were completed without complication and tolerated well.  The patient was released in the company of herself to return home in satisfactory condition.  A follow-up appointment has been scheduled, routine post-op medications prescribed (if required), and post-op instructions were given to the responsible party.           Luis Shook MD, FACS  Board Certified Facial Plastic and Reconstructive Surgery  Board Certified Otolaryngology -- Head and Neck Surgery    Electronically signed by Luis Shook MD,  09/30/24, 9:58 AM CDT.

## 2024-09-30 NOTE — PROGRESS NOTES
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 Garrido presented with a biopsy-proven nodular basal cell carcinoma of the forehead.  She opted for surgical excision and reconstruction.  We discussed sending this for permanent section pathology versus frozen section pathology.  We opted on permanent section pathology.    OPERATIVE FINDINGS:     Lesion: 6 mm x 4 mm  Margins: At least 5 mm  Defect: 14 mm x 20 mm  Flap and Defect: 2.0 cm x 5.5 cm  Depth: To the frontalis musculature    OPERATIVE DETAILS:     After patient verification consent material was reviewed, the patient was taken to the procedure room and laid supine on the procedure table.  The skin at the area was cleansed with alcohol, the area marked, appropriate margins (as listed above) were drawn, and this was converted into an A-T flap design, the patient was then handed a mirror where we reviewed the intended excision, and verified the consent.  This served as the formal timeout procedure.  The skin was  infiltrated with 1% lidocaine with 1-100,000 epinephrine.    After approximately 15 minutes, the skin was tested for anesthesia and deemed appropriate.    The patient was sterilely prepped with  Hibiclens, and sterilely draped.    The skin surrounding the lesion that was previously marked was incised utilizing a #15 blade.  This was undermined in the subcutaneous fat plane, overlying the frontalis musculature.  I placed a stitch at 12:00, and sent this for permanent pathology.  Hemostasis was obtained with bipolar cautery set at 15.    Utilizing a fresh 15 blade, the flap incisions were then created.  The flap was then undermined in the immediate subcutaneous plane utilizing a 15 blade and curved iris scissors.  Hemostasis was again obtained utilizing bipolar cautery set at 15.    The flaps were advanced, and closed utilizing deep 3-0 and 4-0 undyed Vicryl suture.  The skin was further closed utilizing running, locking 4-0 Prolene.      Antibiotic ointment was placed to the incisions and the flaps.      DISPOSITION:  The procedures were completed without complication and tolerated well.  The patient was released in the company of herself to return home in satisfactory condition.  A follow-up appointment has been scheduled, routine post-op medications prescribed (if required), and post-op instructions were given to the responsible party.           Luis Shook MD, FACS  Board Certified Facial Plastic and Reconstructive Surgery  Board Certified Otolaryngology -- Head and Neck Surgery    Electronically signed by Luis Shook MD, 09/30/24, 9:58 AM CDT.

## 2024-10-02 ENCOUNTER — OFFICE VISIT (OUTPATIENT)
Dept: GASTROENTEROLOGY | Age: 54
End: 2024-10-02
Payer: COMMERCIAL

## 2024-10-02 VITALS
SYSTOLIC BLOOD PRESSURE: 122 MMHG | HEIGHT: 63 IN | DIASTOLIC BLOOD PRESSURE: 78 MMHG | OXYGEN SATURATION: 98 % | HEART RATE: 70 BPM | BODY MASS INDEX: 21.26 KG/M2 | WEIGHT: 120 LBS

## 2024-10-02 DIAGNOSIS — K21.9 CHRONIC GERD: ICD-10-CM

## 2024-10-02 DIAGNOSIS — R74.8 ELEVATED ALKALINE PHOSPHATASE LEVEL: Primary | ICD-10-CM

## 2024-10-02 DIAGNOSIS — K22.10 EROSIVE ESOPHAGITIS: ICD-10-CM

## 2024-10-02 PROCEDURE — G8420 CALC BMI NORM PARAMETERS: HCPCS | Performed by: NURSE PRACTITIONER

## 2024-10-02 PROCEDURE — G8427 DOCREV CUR MEDS BY ELIG CLIN: HCPCS | Performed by: NURSE PRACTITIONER

## 2024-10-02 PROCEDURE — G8484 FLU IMMUNIZE NO ADMIN: HCPCS | Performed by: NURSE PRACTITIONER

## 2024-10-02 PROCEDURE — 99214 OFFICE O/P EST MOD 30 MIN: CPT | Performed by: NURSE PRACTITIONER

## 2024-10-02 PROCEDURE — 3017F COLORECTAL CA SCREEN DOC REV: CPT | Performed by: NURSE PRACTITIONER

## 2024-10-02 PROCEDURE — 4004F PT TOBACCO SCREEN RCVD TLK: CPT | Performed by: NURSE PRACTITIONER

## 2024-10-02 NOTE — PROGRESS NOTES
Subjective:     Patient ID: Mounika Lorenzo is a 54 y.o. female  PCP: Elver Arita APRN - CNP  Referring Provider: No ref. provider found    HPI  Patient presents to the office today with the following complaints: Follow-up (6 weeks )      Pt seen today for follow up on chronic GERD.  Trial of Voquezna prescribed at last OV.  Pt states this was working well.  Symptoms have improved.  However, it was cost restrictive.  Per our notes, it looks like this was approved.              Pt also reports an elevation in Alkaline Phosphatase, 116 on 9/10/2024.  Admits to daily ETOH use.  No known liver diseases.        Last EGD 4/3/2024 - W 54 fr dil, (+)GERD, multiple 3-5 mm sessile polyps-likely hyperplastic fundic gland type in body/fundus of stomach   Last Colonoscopy 4/3/2024 - Mod to severe diverticulosis left colon, int hem Gr 1, 3 year recall   Family hx colon cancer - Father  Family hx colon polyps - Sister, Brother, Father  Family hx Crohn's - Mother    LAST OV 8/21/2024  Pt seen today for follow up.  Hx chronic GERD, uncontrolled.  She is taking Protonix 40 mg daily, Carafate.  Pepcid BID OTC added last month when pt called with uncontrolled symptoms.  Today, she continues to have breakthrough reflux everyday.  \"Fire in chest.  Worse if I don't eat.\"  She has tried and failed Omeprazole.  She admits to drinking ETOH, Coffee.  Pt is aware these are triggers for acid reflux.               Assessment:     1. Elevated alkaline phosphatase level    2. Chronic GERD    3. Erosive esophagitis            Plan:   - Stop Protonix   - Continue Voquenza 20 mg daily, samples provided  - Check CMP in 6 weeks, pending results consider liver workup, US liver   - Follow up yearly       Orders  Orders Placed This Encounter   Procedures    Comprehensive Metabolic Panel     Standing Status:   Future     Standing Expiration Date:   10/2/2025     Medications  No orders of the defined types were placed in this encounter.        Patient

## 2024-10-07 LAB
CYTO UR: NORMAL
LAB AP CASE REPORT: NORMAL
LAB AP CLINICAL INFORMATION: NORMAL
Lab: NORMAL
PATH REPORT.FINAL DX SPEC: NORMAL
PATH REPORT.GROSS SPEC: NORMAL

## 2024-10-08 ENCOUNTER — OFFICE VISIT (OUTPATIENT)
Age: 54
End: 2024-10-08
Payer: COMMERCIAL

## 2024-10-08 VITALS
TEMPERATURE: 98 F | BODY MASS INDEX: 21.26 KG/M2 | HEIGHT: 63 IN | SYSTOLIC BLOOD PRESSURE: 124 MMHG | RESPIRATION RATE: 20 BRPM | WEIGHT: 120 LBS | HEART RATE: 82 BPM | DIASTOLIC BLOOD PRESSURE: 76 MMHG

## 2024-10-08 DIAGNOSIS — Z72.0 TOBACCO ABUSE DISORDER: ICD-10-CM

## 2024-10-08 DIAGNOSIS — C44.319 BASAL CELL CARCINOMA OF LEFT FOREHEAD: Primary | ICD-10-CM

## 2024-10-08 PROCEDURE — 99024 POSTOP FOLLOW-UP VISIT: CPT | Performed by: NURSE PRACTITIONER

## 2024-10-08 NOTE — PROGRESS NOTES
DALIA Solomon  [facility] Facial Plastic and Reconstructive Surgery  [address], [address]  Phone: [phone]  Fax: [phone]     PRIMARY CARE PROVIDER: Adam Garcia APRN  REFERRING PROVIDER: No ref. provider found    Chief Complaint   Patient presents with    Post-op     Post-op suture removal       Subjective   History of Present Illness:  Estefany Garrido is a  54 y.o. female  who presents for follow up s/p excision of a nodular basal cell carcinoma of the left superior forehead with A-T flap reconstruction on 2024.  Postoperatively, she has been doing very well.  She denies pain, fever, chills, bleeding, or drainage.     Derm: DUKE Chakraborty    Review of Systems:  Review of Systems   Constitutional:  Negative for chills, fatigue, fever and unexpected weight change.   HENT:  Negative for facial swelling.    Respiratory:  Negative for cough, chest tightness and shortness of breath.    Cardiovascular:  Negative for chest pain.   Musculoskeletal:  Negative for neck pain.   Skin:  Negative for color change.   Neurological:  Negative for facial asymmetry.   Hematological:  Negative for adenopathy. Does not bruise/bleed easily.       Past History:  Past Medical History:   Diagnosis Date    Emphysema of lung     Emphysema of lung     Follow-up exam     after surgery    GERD (gastroesophageal reflux disease)     LGSIL (low grade squamous intraepithelial dysplasia)     Pelvic pain     Subserous leiomyoma of uterus     Tobacco use      Past Surgical History:   Procedure Laterality Date    AUGMENTATION MAMMAPLASTY      BREAST AUGMENTATION      x2    BREAST LUMPECTOMY Left     benign     SECTION      COLONOSCOPY W/ POLYPECTOMY      benign    ENDOMETRIAL ABLATION      uterine ablation    TOTAL LAPAROSCOPIC HYSTERECTOMY SALPINGO OOPHORECTOMY Bilateral 2016    TUBAL ABDOMINAL LIGATION       Family History   Problem Relation Age of Onset    Crohn's disease Mother     Other  Mother         heart valve replacement    Coronary artery disease Mother     Colon cancer Father     Breast cancer Sister     No Known Problems Brother     Breast cancer Paternal Aunt     Breast cancer Paternal Grandmother     No Known Problems Son     No Known Problems Daughter     Emphysema Paternal Grandfather     COPD Maternal Grandmother     Pneumonia Maternal Grandfather     No Known Problems Son     No Known Problems Maternal Aunt     BRCA 1/2 Neg Hx     Endometrial cancer Neg Hx     Ovarian cancer Neg Hx     Uterine cancer Neg Hx      Social History     Tobacco Use    Smoking status: Some Days     Current packs/day: 2.00     Types: Cigarettes    Smokeless tobacco: Never   Substance Use Topics    Alcohol use: Yes     Comment: occasional    Drug use: No     Allergies:  Codeine, Penicillins, and Glue [wound dressing adhesive]    Current Outpatient Medications:     dicyclomine (BENTYL) 10 MG capsule, Take 1 capsule by mouth 4 (Four) Times a Day Before Meals & at Bedtime., Disp: , Rfl:     pantoprazole (PROTONIX) 40 MG EC tablet, Take  by mouth., Disp: , Rfl:     propranolol (INDERAL) 60 MG tablet, Take 1 tablet by mouth 2 (Two) Times a Day., Disp: , Rfl:     sucralfate (CARAFATE) 1 GM/10ML suspension, Take  by mouth., Disp: , Rfl:     Vonoprazan Fumarate (Voquezna) 20 MG tablet, Take 1 tablet by mouth Daily., Disp: , Rfl:       Objective     Vital Signs:  Temp:  [98 °F (36.7 °C)] 98 °F (36.7 °C)  Heart Rate:  [82] 82  Resp:  [20] 20  BP: (124)/(76) 124/76    Physical Exam:  Physical Exam  Vitals reviewed.   Constitutional:       General: She is not in acute distress.     Appearance: She is well-developed.   HENT:      Head: Normocephalic and atraumatic.        Right Ear: External ear normal.      Left Ear: External ear normal.      Mouth/Throat:      Lips: Pink.   Eyes:      General: Lids are normal.   Pulmonary:      Effort: Pulmonary effort is normal. No respiratory distress.      Breath sounds: No stridor.    Musculoskeletal:      Cervical back: Neck supple.   Neurological:      Mental Status: She is alert and oriented to person, place, and time.   Psychiatric:         Mood and Affect: Mood normal.         Speech: Speech normal.         Behavior: Behavior normal. Behavior is cooperative.         Results Review:         Assessment   Assessment:  1. Basal cell carcinoma of left forehead    2. Tobacco abuse disorder        Plan   Plan:  In order to optimize wound healing, it is beneficial to protect the incisions from sunlight for the first year after the procedure.  Sunlight exposure may cause a brown-red discoloration to the incisions, making them more obvious.  Use a sunscreen with titanium dioxide and/or zinc oxide as the active ingredient(s).  Utilize an SPF factor of at least 15.    Use an OTC silicone-based wound cream to the incision daily to optimize wound healing.    We have also discussed treatment of the scarring with the 1540 laser.  I have recommended 4-6 treatments 3 to 4 weeks apart.  She was quoted $50 per treatment.  She may begin treatments in 2 weeks if she wishes to proceed.    It is important to follow-up with your primary care provider or dermatologist every 6 to 12 months for routine skin cancer surveillance.    No orders of the defined types were placed in this encounter.    There are no Patient Instructions on file for this visit.  Return in about 4 months (around 2/8/2025), or if symptoms worsen or fail to improve, for Recheck.    My findings and recommendations were discussed and questions were answered.     Nilam Ramirez, DALIA  10/08/24  13:21 CDT

## 2024-11-12 ENCOUNTER — TELEPHONE (OUTPATIENT)
Dept: GASTROENTEROLOGY | Age: 54
End: 2024-11-12

## 2024-11-12 NOTE — TELEPHONE ENCOUNTER
----- Message from ZANE WILD MA sent at 10/2/2024  3:03 PM CDT -----  Regarding: RE: checkout note 10/2/24  10.2.24  Due for CMP around 11/11  ----- Message -----  From: Esthela Angel  Sent: 10/2/2024   9:29 AM CDT  To: Estefani Rossi MA  Subject: checkout note 10/2/24                            CMP in 6 weeks          11.12.24  Called patient to let her know that it is time for lab work. Told patient if she could go this week to have it done. Patient voiced understanding

## 2024-11-13 ENCOUNTER — TELEPHONE (OUTPATIENT)
Age: 54
End: 2024-11-13

## 2024-11-13 NOTE — TELEPHONE ENCOUNTER
1.  Maintain hydration  2.  Monitor oxygen saturation- call or ER if < 93%.  3.  Tylenol as needed for aches, pains, fevers.  4.  Quarantine per the CDC quarantine guidelines.  They can be found at https://www.cdc.gov/coronavirus/2019-ncov/your-health/quarantine-isolation.html#    I have sent in paxlovid.    Abram Myers MD  15:17 EDT  10/02/24       Provider: DR. ALBERTO BARILLAS    [facility] received a fax that requires your attention. The document has been indexed to the patient's chart for your review.     Reason for sending: REVIEW      Documents Description: REQ FOR PATHOLOGY RESULTS FROM 9/30/24.     Name of Sender: [facility]     Date Indexed: 11/13/24    Notes (if needed): INDEXED INTO CHART AS EXT MED RECS 11/07/24 DATE RECEIVED.

## 2025-02-03 ENCOUNTER — HOSPITAL ENCOUNTER (OUTPATIENT)
Dept: WOMENS IMAGING | Age: 55
Discharge: HOME | End: 2025-02-03
Payer: COMMERCIAL

## 2025-02-03 VITALS — BODY MASS INDEX: 21.61 KG/M2 | WEIGHT: 122 LBS

## 2025-02-03 DIAGNOSIS — Z12.31 ENCOUNTER FOR SCREENING MAMMOGRAM FOR MALIGNANT NEOPLASM OF BREAST: ICD-10-CM

## 2025-02-03 PROCEDURE — 77063 BREAST TOMOSYNTHESIS BI: CPT

## 2025-02-17 ENCOUNTER — OFFICE VISIT (OUTPATIENT)
Age: 55
End: 2025-02-17
Payer: COMMERCIAL

## 2025-02-17 VITALS
HEIGHT: 63 IN | RESPIRATION RATE: 20 BRPM | DIASTOLIC BLOOD PRESSURE: 78 MMHG | TEMPERATURE: 98 F | HEART RATE: 75 BPM | SYSTOLIC BLOOD PRESSURE: 135 MMHG | WEIGHT: 120 LBS | BODY MASS INDEX: 21.26 KG/M2

## 2025-02-17 DIAGNOSIS — C44.319 BASAL CELL CARCINOMA OF LEFT FOREHEAD: Primary | ICD-10-CM

## 2025-02-17 DIAGNOSIS — Z72.0 TOBACCO ABUSE DISORDER: ICD-10-CM

## 2025-02-17 DIAGNOSIS — Z08 ENCOUNTER FOR FOLLOW-UP SURVEILLANCE OF SKIN CANCER: ICD-10-CM

## 2025-02-17 DIAGNOSIS — Z85.828 ENCOUNTER FOR FOLLOW-UP SURVEILLANCE OF SKIN CANCER: ICD-10-CM

## 2025-02-17 DIAGNOSIS — D48.9 NEOPLASM OF UNCERTAIN BEHAVIOR: ICD-10-CM

## 2025-02-17 PROCEDURE — 99213 OFFICE O/P EST LOW 20 MIN: CPT | Performed by: NURSE PRACTITIONER

## 2025-02-17 NOTE — PROGRESS NOTES
DALIA Solomon  [facility]  [address], [address]  Phone: [phone]  Fax: [phone]     PRIMARY CARE PROVIDER: Adam Garcia APRN  REFERRING PROVIDER: No ref. provider found    Chief Complaint   Patient presents with    Follow-up     Wound check f/u       Subjective   History of Present Illness:  Estefany Garrido is a  54 y.o. female who presents for skin cancer surveillance.  She is s/p excision of a nodular basal cell carcinoma of the left superior forehead with A-T flap reconstruction on 2024.  She has been doing well.  She denies any related complaints.  She points out a skin lesion today on her right temple at hairline. It has been present for years. It is not enlarging, bleeding, or painful. She had a recent skin exam by her dermatologist, but forgot to point out this skin lesion.      Derm: [facility]    Review of Systems:  Review of Systems   Constitutional:  Negative for chills, fatigue, fever and unexpected weight change.   HENT:  Negative for facial swelling.    Respiratory:  Negative for cough, chest tightness and shortness of breath.    Cardiovascular:  Negative for chest pain.   Musculoskeletal:  Negative for neck pain.   Skin:  Negative for color change.   Neurological:  Negative for facial asymmetry.   Hematological:  Negative for adenopathy. Does not bruise/bleed easily.       Past History:  Past Medical History:   Diagnosis Date    Emphysema of lung     Emphysema of lung     Follow-up exam     after surgery    GERD (gastroesophageal reflux disease)     LGSIL (low grade squamous intraepithelial dysplasia)     Pelvic pain     Subserous leiomyoma of uterus     Tobacco use      Past Surgical History:   Procedure Laterality Date    AUGMENTATION MAMMAPLASTY      BREAST AUGMENTATION      x2    BREAST LUMPECTOMY Left     benign     SECTION      COLONOSCOPY W/ POLYPECTOMY      benign    ENDOMETRIAL ABLATION       uterine ablation    TOTAL LAPAROSCOPIC HYSTERECTOMY SALPINGO OOPHORECTOMY Bilateral 05/02/2016    TUBAL ABDOMINAL LIGATION       Family History   Problem Relation Age of Onset    Crohn's disease Mother     Other Mother         heart valve replacement    Coronary artery disease Mother     Colon cancer Father     Breast cancer Sister     No Known Problems Brother     Breast cancer Paternal Aunt     Breast cancer Paternal Grandmother     No Known Problems Son     No Known Problems Daughter     Emphysema Paternal Grandfather     COPD Maternal Grandmother     Pneumonia Maternal Grandfather     No Known Problems Son     No Known Problems Maternal Aunt     BRCA 1/2 Neg Hx     Endometrial cancer Neg Hx     Ovarian cancer Neg Hx     Uterine cancer Neg Hx      Social History     Tobacco Use    Smoking status: Some Days     Current packs/day: 2.00     Types: Cigarettes    Smokeless tobacco: Never   Substance Use Topics    Alcohol use: Yes     Comment: occasional    Drug use: No     Allergies:  Codeine, Penicillins, and Glue [wound dressing adhesive]    Current Outpatient Medications:     dicyclomine (BENTYL) 10 MG capsule, Take 1 capsule by mouth 4 (Four) Times a Day Before Meals & at Bedtime., Disp: , Rfl:     pantoprazole (PROTONIX) 40 MG EC tablet, Take  by mouth., Disp: , Rfl:     propranolol (INDERAL) 60 MG tablet, Take 1 tablet by mouth 2 (Two) Times a Day., Disp: , Rfl:     sucralfate (CARAFATE) 1 GM/10ML suspension, Take  by mouth., Disp: , Rfl:     Vonoprazan Fumarate (Voquezna) 20 MG tablet, Take 1 tablet by mouth Daily., Disp: , Rfl:       Objective     Vital Signs:  Temp:  [98 °F (36.7 °C)] 98 °F (36.7 °C)  Heart Rate:  [75] 75  Resp:  [20] 20  BP: (135)/(78) 135/78    Physical Exam:  Physical Exam  Vitals reviewed.   Constitutional:       General: She is not in acute distress.     Appearance: She is well-developed.   HENT:      Head: Normocephalic and atraumatic.        Right Ear: External ear normal.      Left  Ear: External ear normal.      Mouth/Throat:      Lips: Pink.   Eyes:      General: Lids are normal.   Pulmonary:      Effort: Pulmonary effort is normal. No respiratory distress.      Breath sounds: No stridor.   Musculoskeletal:      Cervical back: Neck supple.   Neurological:      Mental Status: She is alert and oriented to person, place, and time.   Psychiatric:         Mood and Affect: Mood normal.         Speech: Speech normal.         Behavior: Behavior normal. Behavior is cooperative.         Results Review:         Assessment   Assessment:  1. Basal cell carcinoma of left forehead    2. Neoplasm of uncertain behavior    3. Encounter for follow-up surveillance of skin cancer    4. Tobacco abuse disorder          Plan   Plan:    I have offered biopsy today of the skin lesion on the right temple to rule out malignancy.  The patient declines and would like to continue with observation.    In order to optimize wound healing, it is beneficial to protect the incisions from sunlight for the first year after the procedure.  Sunlight exposure may cause a brown-red discoloration to the incisions, making them more obvious.  Use a sunscreen with titanium dioxide and/or zinc oxide as the active ingredient(s).  Utilize an SPF factor of at least 15.    Use an OTC silicone-based wound cream to the incision daily to optimize wound healing.    It is important to follow-up with your primary care provider or dermatologist every 6 to 12 months for routine skin cancer surveillance.  She just had a recent skin exam by her dermatologist.    She will follow-up here in 6 months, but was instructed to call or return to the office should she notice any changes to the skin lesion on the right temple.    No orders of the defined types were placed in this encounter.    There are no Patient Instructions on file for this visit.  Return in about 6 months (around 8/17/2025), or if symptoms worsen or fail to improve, for Recheck.    My  findings and recommendations were discussed and questions were answered.     Nilam Ramirez, APRN  02/17/25  10:09 CST

## 2025-07-07 RX ORDER — PROPRANOLOL HYDROCHLORIDE 60 MG/1
60 TABLET ORAL 2 TIMES DAILY
Qty: 60 TABLET | Refills: 5 | Status: SHIPPED | OUTPATIENT
Start: 2025-07-07

## 2025-07-07 NOTE — TELEPHONE ENCOUNTER
Requested Prescriptions     Pending Prescriptions Disp Refills    propranolol (INDERAL) 60 MG immediate release tablet [Pharmacy Med Name: Propranolol HCl 60 MG Oral Tablet] 60 tablet 5     Sig: Take 1 tablet by mouth twice daily       Last Office Visit: 9/10/2024  Next Office Visit: Visit date not found  Last Medication Refill:6/7/2025    PT OF bhaskar

## 2025-08-12 ENCOUNTER — OFFICE VISIT (OUTPATIENT)
Age: 55
End: 2025-08-12
Payer: COMMERCIAL

## 2025-08-12 VITALS
DIASTOLIC BLOOD PRESSURE: 74 MMHG | RESPIRATION RATE: 20 BRPM | BODY MASS INDEX: 21.26 KG/M2 | TEMPERATURE: 98 F | HEIGHT: 63 IN | SYSTOLIC BLOOD PRESSURE: 132 MMHG | WEIGHT: 120 LBS | HEART RATE: 82 BPM

## 2025-08-12 DIAGNOSIS — D48.9 NEOPLASM OF UNCERTAIN BEHAVIOR: ICD-10-CM

## 2025-08-12 DIAGNOSIS — Z72.0 TOBACCO ABUSE DISORDER: ICD-10-CM

## 2025-08-12 DIAGNOSIS — Z08 ENCOUNTER FOR FOLLOW-UP SURVEILLANCE OF SKIN CANCER: ICD-10-CM

## 2025-08-12 DIAGNOSIS — C44.319 BASAL CELL CARCINOMA OF LEFT FOREHEAD: Primary | ICD-10-CM

## 2025-08-12 DIAGNOSIS — Z85.828 ENCOUNTER FOR FOLLOW-UP SURVEILLANCE OF SKIN CANCER: ICD-10-CM

## 2025-08-25 ENCOUNTER — OFFICE VISIT (OUTPATIENT)
Age: 55
End: 2025-08-25
Payer: COMMERCIAL

## 2025-08-25 VITALS
BODY MASS INDEX: 21.26 KG/M2 | HEART RATE: 75 BPM | TEMPERATURE: 98 F | DIASTOLIC BLOOD PRESSURE: 70 MMHG | HEIGHT: 63 IN | WEIGHT: 120 LBS | SYSTOLIC BLOOD PRESSURE: 136 MMHG | RESPIRATION RATE: 20 BRPM

## 2025-08-25 DIAGNOSIS — D48.7 NEOPLASM OF UNCERTAIN BEHAVIOR OF CONJUNCTIVA: ICD-10-CM

## 2025-08-25 DIAGNOSIS — Z72.0 TOBACCO ABUSE: Primary | ICD-10-CM

## 2025-08-25 PROCEDURE — 99214 OFFICE O/P EST MOD 30 MIN: CPT | Performed by: OTOLARYNGOLOGY

## (undated) DEVICE — ENDO KIT,LOURDES HOSPITAL: Brand: MEDLINE INDUSTRIES, INC.

## (undated) DEVICE — CLEANING SPONGE: Brand: KOALA™

## (undated) DEVICE — SINGLE PORT MANIFOLD: Brand: NEPTUNE 2

## (undated) DEVICE — CANNULA NSL AD L7FT DIV O2 CO2 W/ M LUERLOCK TRMPT CONN

## (undated) DEVICE — FORCEPS BX L240CM JAW DIA2.8MM L CAP W/ NDL MIC MESH TOOTH

## (undated) DEVICE — BRUSH ENDOSCP 2 END CHN HEDGEHOG

## (undated) DEVICE — BITE BLOCK ENDOSCP AD 60 FR W/ ADJ STRP PLAS GRN BLOX

## (undated) DEVICE — ADAPTER CLEANING PORPOISE CLEANING

## (undated) DEVICE — FORCEPS BX 240CM 2.4MM L NDL RAD JAW 4 M00513334